# Patient Record
Sex: FEMALE | Race: WHITE | NOT HISPANIC OR LATINO
[De-identification: names, ages, dates, MRNs, and addresses within clinical notes are randomized per-mention and may not be internally consistent; named-entity substitution may affect disease eponyms.]

---

## 2017-01-03 ENCOUNTER — ASOB RESULT (OUTPATIENT)
Age: 37
End: 2017-01-03

## 2017-01-03 ENCOUNTER — APPOINTMENT (OUTPATIENT)
Dept: ANTEPARTUM | Facility: CLINIC | Age: 37
End: 2017-01-03

## 2017-02-28 ENCOUNTER — ASOB RESULT (OUTPATIENT)
Age: 37
End: 2017-02-28

## 2017-02-28 ENCOUNTER — APPOINTMENT (OUTPATIENT)
Dept: ANTEPARTUM | Facility: CLINIC | Age: 37
End: 2017-02-28

## 2017-03-10 ENCOUNTER — TRANSCRIPTION ENCOUNTER (OUTPATIENT)
Age: 37
End: 2017-03-10

## 2017-05-01 ENCOUNTER — APPOINTMENT (OUTPATIENT)
Dept: ANTEPARTUM | Facility: CLINIC | Age: 37
End: 2017-05-01

## 2017-05-01 ENCOUNTER — ASOB RESULT (OUTPATIENT)
Age: 37
End: 2017-05-01

## 2017-07-25 ENCOUNTER — INPATIENT (INPATIENT)
Facility: HOSPITAL | Age: 37
LOS: 2 days | Discharge: HOME | End: 2017-07-28
Payer: COMMERCIAL

## 2017-07-25 ENCOUNTER — TRANSCRIPTION ENCOUNTER (OUTPATIENT)
Age: 37
End: 2017-07-25

## 2017-07-25 VITALS — WEIGHT: 167.55 LBS | HEIGHT: 63 IN

## 2017-07-25 DIAGNOSIS — Z3A.00 WEEKS OF GESTATION OF PREGNANCY NOT SPECIFIED: ICD-10-CM

## 2017-07-25 DIAGNOSIS — O26.899 OTHER SPECIFIED PREGNANCY RELATED CONDITIONS, UNSPECIFIED TRIMESTER: ICD-10-CM

## 2017-07-25 LAB
ANTIBODY ID 1_1: SIGNIFICANT CHANGE UP
BASOPHILS # BLD AUTO: 0.04 K/UL — SIGNIFICANT CHANGE UP (ref 0–0.2)
BASOPHILS NFR BLD AUTO: 0.3 % — SIGNIFICANT CHANGE UP (ref 0–2)
BLD GP AB SCN SERPL QL: POSITIVE — SIGNIFICANT CHANGE UP
DAT POLY-SP REAG RBC QL: NEGATIVE — SIGNIFICANT CHANGE UP
EOSINOPHIL # BLD AUTO: 0.03 K/UL — SIGNIFICANT CHANGE UP (ref 0–0.5)
EOSINOPHIL NFR BLD AUTO: 0.2 % — SIGNIFICANT CHANGE UP (ref 0–6)
HCT VFR BLD CALC: 38.1 % — SIGNIFICANT CHANGE UP (ref 34.5–45)
HGB BLD-MCNC: 13.9 G/DL — SIGNIFICANT CHANGE UP (ref 11.5–15.5)
IMM GRANULOCYTES # BLD AUTO: 0.08 # — SIGNIFICANT CHANGE UP
IMM GRANULOCYTES NFR BLD AUTO: 0.6 % — SIGNIFICANT CHANGE UP (ref 0–1.5)
LYMPHOCYTES # BLD AUTO: 16.1 % — SIGNIFICANT CHANGE UP (ref 13–44)
LYMPHOCYTES # BLD AUTO: 2.32 K/UL — SIGNIFICANT CHANGE UP (ref 1–3.3)
MCHC RBC-ENTMCNC: 33.3 PG — SIGNIFICANT CHANGE UP (ref 27–34)
MCHC RBC-ENTMCNC: 36.5 % — HIGH (ref 32–36)
MCV RBC AUTO: 91.4 FL — SIGNIFICANT CHANGE UP (ref 80–100)
MONOCYTES # BLD AUTO: 0.59 K/UL — SIGNIFICANT CHANGE UP (ref 0–0.9)
MONOCYTES NFR BLD AUTO: 4.1 % — SIGNIFICANT CHANGE UP (ref 2–14)
NEUTROPHILS # BLD AUTO: 11.35 K/UL — HIGH (ref 1.8–7.4)
NEUTROPHILS NFR BLD AUTO: 78.7 % — HIGH (ref 43–77)
NRBC # FLD: 0 — SIGNIFICANT CHANGE UP
PLATELET # BLD AUTO: 295 K/UL — SIGNIFICANT CHANGE UP (ref 150–400)
PMV BLD: 9.6 FL — SIGNIFICANT CHANGE UP (ref 7–13)
RBC # BLD: 4.17 M/UL — SIGNIFICANT CHANGE UP (ref 3.8–5.2)
RBC # FLD: 12.7 % — SIGNIFICANT CHANGE UP (ref 10.3–14.5)
RH IG SCN BLD-IMP: NEGATIVE — SIGNIFICANT CHANGE UP
RH IG SCN BLD-IMP: NEGATIVE — SIGNIFICANT CHANGE UP
WBC # BLD: 14.41 K/UL — HIGH (ref 3.8–10.5)
WBC # FLD AUTO: 14.41 K/UL — HIGH (ref 3.8–10.5)

## 2017-07-25 PROCEDURE — 86077 PHYS BLOOD BANK SERV XMATCH: CPT

## 2017-07-25 RX ORDER — SODIUM CHLORIDE 9 MG/ML
1000 INJECTION, SOLUTION INTRAVENOUS ONCE
Qty: 0 | Refills: 0 | Status: DISCONTINUED | OUTPATIENT
Start: 2017-07-25 | End: 2017-07-26

## 2017-07-25 RX ORDER — PENICILLIN G POTASSIUM 5000000 [IU]/1
5 POWDER, FOR SOLUTION INTRAMUSCULAR; INTRAPLEURAL; INTRATHECAL; INTRAVENOUS ONCE
Qty: 0 | Refills: 0 | Status: COMPLETED | OUTPATIENT
Start: 2017-07-25 | End: 2017-07-25

## 2017-07-25 RX ORDER — SODIUM CHLORIDE 9 MG/ML
1000 INJECTION, SOLUTION INTRAVENOUS
Qty: 0 | Refills: 0 | Status: DISCONTINUED | OUTPATIENT
Start: 2017-07-25 | End: 2017-07-27

## 2017-07-25 RX ORDER — CITRIC ACID/SODIUM CITRATE 300-500 MG
15 SOLUTION, ORAL ORAL EVERY 4 HOURS
Qty: 0 | Refills: 0 | Status: DISCONTINUED | OUTPATIENT
Start: 2017-07-25 | End: 2017-07-26

## 2017-07-25 RX ORDER — SODIUM CHLORIDE 9 MG/ML
1000 INJECTION, SOLUTION INTRAVENOUS
Qty: 0 | Refills: 0 | Status: DISCONTINUED | OUTPATIENT
Start: 2017-07-25 | End: 2017-07-26

## 2017-07-25 RX ORDER — OXYTOCIN 10 UNIT/ML
333.33 VIAL (ML) INJECTION
Qty: 20 | Refills: 0 | Status: COMPLETED | OUTPATIENT
Start: 2017-07-25

## 2017-07-25 RX ORDER — PENICILLIN G POTASSIUM 5000000 [IU]/1
POWDER, FOR SOLUTION INTRAMUSCULAR; INTRAPLEURAL; INTRATHECAL; INTRAVENOUS
Qty: 0 | Refills: 0 | Status: DISCONTINUED | OUTPATIENT
Start: 2017-07-25 | End: 2017-07-26

## 2017-07-25 RX ORDER — PENICILLIN G POTASSIUM 5000000 [IU]/1
2.5 POWDER, FOR SOLUTION INTRAMUSCULAR; INTRAPLEURAL; INTRATHECAL; INTRAVENOUS EVERY 4 HOURS
Qty: 0 | Refills: 0 | Status: DISCONTINUED | OUTPATIENT
Start: 2017-07-25 | End: 2017-07-26

## 2017-07-25 RX ADMIN — SODIUM CHLORIDE 125 MILLILITER(S): 9 INJECTION, SOLUTION INTRAVENOUS at 17:07

## 2017-07-25 RX ADMIN — PENICILLIN G POTASSIUM 200 MILLION UNIT(S): 5000000 POWDER, FOR SOLUTION INTRAMUSCULAR; INTRAPLEURAL; INTRATHECAL; INTRAVENOUS at 19:42

## 2017-07-25 RX ADMIN — PENICILLIN G POTASSIUM 200 MILLION UNIT(S): 5000000 POWDER, FOR SOLUTION INTRAMUSCULAR; INTRAPLEURAL; INTRATHECAL; INTRAVENOUS at 15:12

## 2017-07-25 RX ADMIN — SODIUM CHLORIDE 125 MILLILITER(S): 9 INJECTION, SOLUTION INTRAVENOUS at 10:57

## 2017-07-25 RX ADMIN — PENICILLIN G POTASSIUM 200 MILLION UNIT(S): 5000000 POWDER, FOR SOLUTION INTRAMUSCULAR; INTRAPLEURAL; INTRATHECAL; INTRAVENOUS at 23:45

## 2017-07-25 RX ADMIN — PENICILLIN G POTASSIUM 200 MILLION UNIT(S): 5000000 POWDER, FOR SOLUTION INTRAMUSCULAR; INTRAPLEURAL; INTRATHECAL; INTRAVENOUS at 11:05

## 2017-07-25 NOTE — DISCHARGE NOTE OB - MEDICATION SUMMARY - MEDICATIONS TO TAKE
I will START or STAY ON the medications listed below when I get home from the hospital:    acetaminophen 325 mg oral tablet  -- 3 tab(s) by mouth every 6 hours  -- Indication: For Pain    ibuprofen 600 mg oral tablet  -- 1 tab(s) by mouth every 6 hours  -- Indication: For Pain    magnesium hydroxide 8% oral suspension  -- 30 milliliter(s) by mouth 2 times a day, As needed, Constipation  -- Indication: For constipation    lanolin topical ointment  -- 1 application on skin every 6 hours, As needed, Sore Nipples  -- Indication: For sore nipples    Prenatal Multivitamins with Folic Acid 1 mg oral tablet  -- 1 tab(s) by mouth once a day  -- Indication: For vitamin    docusate sodium 100 mg oral capsule  -- 1 cap(s) by mouth 2 times a day, As needed, Stool Softening  -- Indication: For stool softner

## 2017-07-25 NOTE — DISCHARGE NOTE OB - CARE PLAN
Principal Discharge DX:	40 weeks gestation of pregnancy  Goal:	Safe delivery of patient  Instructions for follow-up, activity and diet:	Nothing in the vagina, no tampons, douches, baths, swimming or sex for 6-8 weeks.  Regular diet, follow up visit in 3 weeks  Secondary Diagnosis:	Amniotic fluid leaking

## 2017-07-25 NOTE — DISCHARGE NOTE OB - OTHER SIGNIFICANT FINDINGS
37 y/o W/F G1 at 40.5 weeks who presented with SROM at 7:40 am on 7/25- clear fluid. Pt has had a benign prenatal course. Pelvic exam on admission revealed cervix to be 1cm, 50%, -3 station. She was admitted for IOL with po Cytotec 37 y/o W/F G1 at 40.5 weeks who presented with SROM at 7:40 am on - clear fluid. Pt has had a benign prenatal course. Pelvic exam on admission revealed cervix to be 1cm, 50%, -3 station. She was admitted for IOL with po Cytotec   a viable baby girl with second degree laceration.

## 2017-07-25 NOTE — DISCHARGE NOTE OB - MATERIALS PROVIDED
Breastfeeding Log/Tdap Vaccination (VIS Pub Date: 2012)/[facility] Hearing Screen Program/Guide to Postpartum Care/Shaken Baby Prevention Handout/Breastfeeding Guide and Packet/Birth Certificate Instructions/  Immunization Record/[facility] [place] Screening Program

## 2017-07-25 NOTE — DISCHARGE NOTE OB - PATIENT PORTAL LINK FT
“You can access the FollowHealth Patient Portal, offered by [facility], by registering with the following website: [email or web address]”

## 2017-07-25 NOTE — DISCHARGE NOTE OB - CARE PROVIDER_API CALL
Shannan Youssef), Obstetrics and Gynecology  [address] [place] [address]  Phone: [phone]  Fax: [phone]

## 2017-07-26 LAB — T PALLIDUM AB TITR SER: NEGATIVE — SIGNIFICANT CHANGE UP

## 2017-07-26 RX ORDER — ACETAMINOPHEN 500 MG
650 TABLET ORAL EVERY 6 HOURS
Qty: 0 | Refills: 0 | Status: DISCONTINUED | OUTPATIENT
Start: 2017-07-26 | End: 2017-07-27

## 2017-07-26 RX ORDER — SODIUM CHLORIDE 9 MG/ML
3 INJECTION INTRAMUSCULAR; INTRAVENOUS; SUBCUTANEOUS EVERY 8 HOURS
Qty: 0 | Refills: 0 | Status: DISCONTINUED | OUTPATIENT
Start: 2017-07-26 | End: 2017-07-27

## 2017-07-26 RX ORDER — DIBUCAINE 1 %
1 OINTMENT (GRAM) RECTAL EVERY 4 HOURS
Qty: 0 | Refills: 0 | Status: DISCONTINUED | OUTPATIENT
Start: 2017-07-26 | End: 2017-07-28

## 2017-07-26 RX ORDER — DOCUSATE SODIUM 100 MG
100 CAPSULE ORAL
Qty: 0 | Refills: 0 | Status: DISCONTINUED | OUTPATIENT
Start: 2017-07-26 | End: 2017-07-28

## 2017-07-26 RX ORDER — SIMETHICONE 80 MG/1
80 TABLET, CHEWABLE ORAL EVERY 6 HOURS
Qty: 0 | Refills: 0 | Status: DISCONTINUED | OUTPATIENT
Start: 2017-07-26 | End: 2017-07-28

## 2017-07-26 RX ORDER — DIPHENHYDRAMINE HCL 50 MG
25 CAPSULE ORAL EVERY 6 HOURS
Qty: 0 | Refills: 0 | Status: DISCONTINUED | OUTPATIENT
Start: 2017-07-26 | End: 2017-07-28

## 2017-07-26 RX ORDER — MAGNESIUM HYDROXIDE 400 MG/1
30 TABLET, CHEWABLE ORAL
Qty: 0 | Refills: 0 | Status: DISCONTINUED | OUTPATIENT
Start: 2017-07-26 | End: 2017-07-28

## 2017-07-26 RX ORDER — SODIUM CHLORIDE 9 MG/ML
1000 INJECTION, SOLUTION INTRAVENOUS
Qty: 0 | Refills: 0 | Status: DISCONTINUED | OUTPATIENT
Start: 2017-07-26 | End: 2017-07-27

## 2017-07-26 RX ORDER — PRAMOXINE HYDROCHLORIDE 150 MG/15G
1 AEROSOL, FOAM RECTAL EVERY 4 HOURS
Qty: 0 | Refills: 0 | Status: DISCONTINUED | OUTPATIENT
Start: 2017-07-26 | End: 2017-07-27

## 2017-07-26 RX ORDER — IBUPROFEN 200 MG
600 TABLET ORAL EVERY 6 HOURS
Qty: 0 | Refills: 0 | Status: DISCONTINUED | OUTPATIENT
Start: 2017-07-26 | End: 2017-07-27

## 2017-07-26 RX ORDER — HYDROCORTISONE 1 %
1 OINTMENT (GRAM) TOPICAL EVERY 4 HOURS
Qty: 0 | Refills: 0 | Status: DISCONTINUED | OUTPATIENT
Start: 2017-07-26 | End: 2017-07-27

## 2017-07-26 RX ORDER — LANOLIN
1 OINTMENT (GRAM) TOPICAL EVERY 6 HOURS
Qty: 0 | Refills: 0 | Status: DISCONTINUED | OUTPATIENT
Start: 2017-07-26 | End: 2017-07-28

## 2017-07-26 RX ORDER — TETANUS TOXOID, REDUCED DIPHTHERIA TOXOID AND ACELLULAR PERTUSSIS VACCINE, ADSORBED 5; 2.5; 8; 8; 2.5 [IU]/.5ML; [IU]/.5ML; UG/.5ML; UG/.5ML; UG/.5ML
0.5 SUSPENSION INTRAMUSCULAR ONCE
Qty: 0 | Refills: 0 | Status: DISCONTINUED | OUTPATIENT
Start: 2017-07-26 | End: 2017-07-28

## 2017-07-26 RX ORDER — OXYTOCIN 10 UNIT/ML
333.33 VIAL (ML) INJECTION
Qty: 20 | Refills: 0 | Status: COMPLETED | OUTPATIENT
Start: 2017-07-26 | End: 2017-07-26

## 2017-07-26 RX ORDER — OXYCODONE AND ACETAMINOPHEN 5; 325 MG/1; MG/1
2 TABLET ORAL EVERY 6 HOURS
Qty: 0 | Refills: 0 | Status: DISCONTINUED | OUTPATIENT
Start: 2017-07-26 | End: 2017-07-27

## 2017-07-26 RX ORDER — GLYCERIN ADULT
1 SUPPOSITORY, RECTAL RECTAL AT BEDTIME
Qty: 0 | Refills: 0 | Status: DISCONTINUED | OUTPATIENT
Start: 2017-07-26 | End: 2017-07-28

## 2017-07-26 RX ORDER — OXYTOCIN 10 UNIT/ML
41.67 VIAL (ML) INJECTION
Qty: 20 | Refills: 0 | Status: DISCONTINUED | OUTPATIENT
Start: 2017-07-26 | End: 2017-07-27

## 2017-07-26 RX ORDER — OXYTOCIN 10 UNIT/ML
2 VIAL (ML) INJECTION
Qty: 30 | Refills: 0 | Status: DISCONTINUED | OUTPATIENT
Start: 2017-07-26 | End: 2017-07-27

## 2017-07-26 RX ORDER — AER TRAVELER 0.5 G/1
1 SOLUTION RECTAL; TOPICAL EVERY 4 HOURS
Qty: 0 | Refills: 0 | Status: DISCONTINUED | OUTPATIENT
Start: 2017-07-26 | End: 2017-07-28

## 2017-07-26 RX ADMIN — SODIUM CHLORIDE 150 MILLILITER(S): 9 INJECTION, SOLUTION INTRAVENOUS at 03:50

## 2017-07-26 RX ADMIN — Medication 2 MILLIUNIT(S)/MIN: at 05:06

## 2017-07-26 RX ADMIN — Medication 999.99 MILLIUNIT(S)/MIN: at 13:15

## 2017-07-26 RX ADMIN — PENICILLIN G POTASSIUM 200 MILLION UNIT(S): 5000000 POWDER, FOR SOLUTION INTRAMUSCULAR; INTRAPLEURAL; INTRATHECAL; INTRAVENOUS at 08:48

## 2017-07-26 RX ADMIN — Medication 650 MILLIGRAM(S): at 17:44

## 2017-07-26 RX ADMIN — Medication 1 TABLET(S): at 18:13

## 2017-07-26 RX ADMIN — Medication 650 MILLIGRAM(S): at 18:24

## 2017-07-26 RX ADMIN — PENICILLIN G POTASSIUM 200 MILLION UNIT(S): 5000000 POWDER, FOR SOLUTION INTRAMUSCULAR; INTRAPLEURAL; INTRATHECAL; INTRAVENOUS at 04:21

## 2017-07-26 RX ADMIN — SODIUM CHLORIDE 125 MILLILITER(S): 9 INJECTION, SOLUTION INTRAVENOUS at 01:43

## 2017-07-26 RX ADMIN — Medication 125 MILLIUNIT(S)/MIN: at 15:13

## 2017-07-26 RX ADMIN — SODIUM CHLORIDE 3 MILLILITER(S): 9 INJECTION INTRAMUSCULAR; INTRAVENOUS; SUBCUTANEOUS at 22:30

## 2017-07-27 LAB — RBC # BLD FETUS AUTO: 0 — SIGNIFICANT CHANGE UP

## 2017-07-27 RX ORDER — MAGNESIUM HYDROXIDE 400 MG/1
30 TABLET, CHEWABLE ORAL
Qty: 0 | Refills: 0 | DISCHARGE
Start: 2017-07-27

## 2017-07-27 RX ORDER — PRAMOXINE HYDROCHLORIDE 150 MG/15G
1 AEROSOL, FOAM RECTAL EVERY 4 HOURS
Qty: 0 | Refills: 0 | Status: DISCONTINUED | OUTPATIENT
Start: 2017-07-27 | End: 2017-07-28

## 2017-07-27 RX ORDER — ACETAMINOPHEN 500 MG
975 TABLET ORAL EVERY 6 HOURS
Qty: 0 | Refills: 0 | Status: DISCONTINUED | OUTPATIENT
Start: 2017-07-27 | End: 2017-07-28

## 2017-07-27 RX ORDER — LANOLIN
1 OINTMENT (GRAM) TOPICAL
Qty: 0 | Refills: 0 | DISCHARGE
Start: 2017-07-27

## 2017-07-27 RX ORDER — IBUPROFEN 200 MG
1 TABLET ORAL
Qty: 40 | Refills: 0
Start: 2017-07-27 | End: 2017-08-06

## 2017-07-27 RX ORDER — DOCUSATE SODIUM 100 MG
1 CAPSULE ORAL
Qty: 0 | Refills: 0 | DISCHARGE
Start: 2017-07-27

## 2017-07-27 RX ORDER — OXYCODONE HYDROCHLORIDE 5 MG/1
5 TABLET ORAL
Qty: 0 | Refills: 0 | Status: DISCONTINUED | OUTPATIENT
Start: 2017-07-27 | End: 2017-07-28

## 2017-07-27 RX ORDER — ACETAMINOPHEN 500 MG
3 TABLET ORAL
Qty: 0 | Refills: 0 | DISCHARGE
Start: 2017-07-27

## 2017-07-27 RX ORDER — HYDROCORTISONE 1 %
1 OINTMENT (GRAM) TOPICAL EVERY 4 HOURS
Qty: 0 | Refills: 0 | Status: DISCONTINUED | OUTPATIENT
Start: 2017-07-27 | End: 2017-07-28

## 2017-07-27 RX ORDER — IBUPROFEN 200 MG
600 TABLET ORAL EVERY 6 HOURS
Qty: 0 | Refills: 0 | Status: DISCONTINUED | OUTPATIENT
Start: 2017-07-27 | End: 2017-07-28

## 2017-07-27 RX ORDER — OXYCODONE HYDROCHLORIDE 5 MG/1
5 TABLET ORAL EVERY 4 HOURS
Qty: 0 | Refills: 0 | Status: DISCONTINUED | OUTPATIENT
Start: 2017-07-27 | End: 2017-07-28

## 2017-07-27 RX ADMIN — Medication 600 MILLIGRAM(S): at 17:44

## 2017-07-27 RX ADMIN — Medication 975 MILLIGRAM(S): at 17:18

## 2017-07-27 RX ADMIN — Medication 600 MILLIGRAM(S): at 23:08

## 2017-07-27 RX ADMIN — Medication 600 MILLIGRAM(S): at 17:20

## 2017-07-27 RX ADMIN — Medication 975 MILLIGRAM(S): at 23:08

## 2017-07-27 RX ADMIN — Medication 975 MILLIGRAM(S): at 06:20

## 2017-07-27 RX ADMIN — Medication 600 MILLIGRAM(S): at 01:00

## 2017-07-27 RX ADMIN — Medication 600 MILLIGRAM(S): at 12:46

## 2017-07-27 RX ADMIN — Medication 975 MILLIGRAM(S): at 06:47

## 2017-07-27 RX ADMIN — SODIUM CHLORIDE 3 MILLILITER(S): 9 INJECTION INTRAMUSCULAR; INTRAVENOUS; SUBCUTANEOUS at 06:30

## 2017-07-27 RX ADMIN — Medication 650 MILLIGRAM(S): at 01:00

## 2017-07-27 RX ADMIN — Medication 600 MILLIGRAM(S): at 06:20

## 2017-07-27 RX ADMIN — Medication 600 MILLIGRAM(S): at 14:46

## 2017-07-27 RX ADMIN — Medication 100 MILLIGRAM(S): at 17:18

## 2017-07-27 RX ADMIN — Medication 650 MILLIGRAM(S): at 00:03

## 2017-07-27 RX ADMIN — Medication 600 MILLIGRAM(S): at 00:04

## 2017-07-27 RX ADMIN — Medication 1 TABLET(S): at 14:46

## 2017-07-27 RX ADMIN — Medication 975 MILLIGRAM(S): at 17:44

## 2017-07-27 RX ADMIN — Medication 600 MILLIGRAM(S): at 06:47

## 2017-07-27 NOTE — LACTATION INITIAL EVALUATION - INTERVENTION OUTCOME
verbalizes understanding/nbn demonstrated  deep latch and  performed  with sucking and swallowing  noted   pt instructed to notify pediatrician if nbn not feeding 8-12 times/24 hours and not voiding and stooling according to breastfeeding log.   ,  outside  support   given .   rn  made aware of plan and to document latch and  positioning of  further  feeding and  assist  as  necessary.

## 2017-07-27 NOTE — PROGRESS NOTE ADULT - SUBJECTIVE AND OBJECTIVE BOX
S:MAGDI MAGANA is a 36y Female status post vaginal delivery.   No h/o HF  Other pregnancy-related conditions, antepartum  Weeks of gestation of pregnancy not specified  MEWS Score  40 weeks gestation of pregnancy  Amniotic fluid leaking        O:T(C): 36.7 (07-27-17 @ 05:27), Max: 36.7 (07-27-17 @ 05:27)  HR: 73 (07-27-17 @ 05:27) (73 - 86)  BP: 103/57 (07-27-17 @ 05:27) (90/64 - 103/57)  RR: 18 (07-27-17 @ 05:27) (18 - 18)  SpO2: 99% (07-27-17 @ 05:27) (99% - 99%)  Wt(kg): --                                   Blood type:  ABO Interpretation: A (07-25 @ 22:49)        Rh Interpretation: Negative (07-25 @ 22:49)                         Rubella:  IMMUNE          General: alert and oriented        Breast:  soft, nontender,         Abdomin:  soft nontender, BS+        Fundus:  firm, nontender, below the umbilicus        Extremities:  no edema, no cyanosis, normal reflexes        Lochia:  mild                  A:  Stable postpartum on Day 1.      P:  Routine postpartum care. I reviewed pain medications with her-ibuprofen 600 mg every 6 hours/tylenol 650mg four hours after         ibuprofen dose to cover breakthrough pain.

## 2017-07-27 NOTE — LACTATION INITIAL EVALUATION - LACTATION INTERVENTIONS
initiate hand expression routine/initiate skin to skin/assisted with deep latch and positioning  discussed  signs  of  effective  feeding and  swallowing.  discussed  compression at  breast when  nbn  stops  drinking  and  is  still sucking.  ,  discussed   ways  to  keep  nbn  awake

## 2017-07-28 VITALS
OXYGEN SATURATION: 98 % | TEMPERATURE: 98 F | SYSTOLIC BLOOD PRESSURE: 119 MMHG | RESPIRATION RATE: 18 BRPM | HEART RATE: 89 BPM | DIASTOLIC BLOOD PRESSURE: 69 MMHG

## 2017-07-28 RX ADMIN — Medication 600 MILLIGRAM(S): at 07:02

## 2017-07-28 RX ADMIN — Medication 600 MILLIGRAM(S): at 08:00

## 2017-07-28 RX ADMIN — Medication 975 MILLIGRAM(S): at 00:00

## 2017-07-28 RX ADMIN — Medication 975 MILLIGRAM(S): at 08:00

## 2017-07-28 RX ADMIN — Medication 975 MILLIGRAM(S): at 07:02

## 2017-07-28 RX ADMIN — Medication 600 MILLIGRAM(S): at 00:00

## 2017-07-28 NOTE — PROGRESS NOTE ADULT - SUBJECTIVE AND OBJECTIVE BOX
S:  MAGDI MAGANA is a 36y Female [place] [facility] vaginal delivery Day 2.        She is comfortable and offers no compliants.    O:  T(C): 36.7 (07-28-17 @ 06:02), Max: 36.8 (07-27-17 @ 22:00)  HR: 89 (07-28-17 @ 06:02) (74 - 89)  BP: 119/69 (07-28-17 @ 06:02) (111/61 - 119/69)  RR: 18 (07-28-17 @ 06:02) (17 - 18)  SpO2: 98% (07-28-17 @ 06:02) (98% - 99%)  Wt(kg): --                       Blood type:Type + Screen (07.25.17 @ 10:25)    ABO Interpretation: A    Rh Interpretation: Negative    Antibody Screen: Positive                                 Rubella:  IMMUNE               General: alert and oriented           Breast:  soft, nontender,            Abdomin:  soft nontender, BS+, Flatus(+), BM(+)           Fundus:  firm, nontender, below the umbilicus           Extremities:  no edema, no cyanosis, normal reflexes           Lochia:  mild    A:  Stable, Postpartum    P:   MAGDI MAGANA will be discharged home today. She is given instructions:                            1. nothing per vagina-no tampons, douches, baths, swiming or sex for 6-8 weeks             2.  to take ibuprofen 600 mg every 6 hours for pain or cramps. (Can take two Tylenol 325mg tablets every 6 hours as an                    alternative pain medication-NO MORE THAN 4000mg of Tylenol over 24hours)                       3.  Call the office and make postpartum visit for 3 weeks; sooner if bleeding becomes heavier, or she has feelings of                  depression or anxiety or develops a fever greater than 100.4 F.                 To use abdominal binder while awake as needed                 Verbal discharge instructions d/w patient  - discharge summary to be  given to her on discharge for the hospital             4.  No driving x 2 weeks.  MAGDI MAGANA is not to drive if taking narcotics             5.  Continue taking prenatal vitamins

## 2019-03-13 NOTE — PROGRESS NOTE ADULT - ATTENDING COMMENTS
Patient is doing well  Routine postpartum care
Strong peripheral pulses
Postpartum instructions given

## 2019-04-16 ENCOUNTER — APPOINTMENT (OUTPATIENT)
Dept: ANTEPARTUM | Facility: CLINIC | Age: 39
End: 2019-04-16
Payer: COMMERCIAL

## 2019-04-16 ENCOUNTER — ASOB RESULT (OUTPATIENT)
Age: 39
End: 2019-04-16

## 2019-04-16 PROCEDURE — 76801 OB US < 14 WKS SINGLE FETUS: CPT

## 2019-04-16 PROCEDURE — 76813 OB US NUCHAL MEAS 1 GEST: CPT

## 2019-04-16 PROCEDURE — 36416 COLLJ CAPILLARY BLOOD SPEC: CPT

## 2019-06-14 ENCOUNTER — APPOINTMENT (OUTPATIENT)
Dept: ANTEPARTUM | Facility: CLINIC | Age: 39
End: 2019-06-14
Payer: COMMERCIAL

## 2019-06-14 ENCOUNTER — ASOB RESULT (OUTPATIENT)
Age: 39
End: 2019-06-14

## 2019-06-14 PROCEDURE — 76811 OB US DETAILED SNGL FETUS: CPT

## 2019-06-14 PROCEDURE — 76817 TRANSVAGINAL US OBSTETRIC: CPT

## 2019-10-31 ENCOUNTER — INPATIENT (INPATIENT)
Facility: HOSPITAL | Age: 39
LOS: 1 days | Discharge: HOME | End: 2019-11-02

## 2019-10-31 ENCOUNTER — TRANSCRIPTION ENCOUNTER (OUTPATIENT)
Age: 39
End: 2019-10-31

## 2019-10-31 VITALS
TEMPERATURE: 98 F | DIASTOLIC BLOOD PRESSURE: 84 MMHG | HEART RATE: 102 BPM | SYSTOLIC BLOOD PRESSURE: 136 MMHG | RESPIRATION RATE: 18 BRPM

## 2019-10-31 DIAGNOSIS — O26.899 OTHER SPECIFIED PREGNANCY RELATED CONDITIONS, UNSPECIFIED TRIMESTER: ICD-10-CM

## 2019-10-31 DIAGNOSIS — Z3A.00 WEEKS OF GESTATION OF PREGNANCY NOT SPECIFIED: ICD-10-CM

## 2019-10-31 LAB
BASOPHILS # BLD AUTO: 0.05 K/UL — SIGNIFICANT CHANGE UP (ref 0–0.2)
BASOPHILS NFR BLD AUTO: 0.3 % — SIGNIFICANT CHANGE UP (ref 0–2)
BLD GP AB SCN SERPL QL: NEGATIVE — SIGNIFICANT CHANGE UP
EOSINOPHIL # BLD AUTO: 0.01 K/UL — SIGNIFICANT CHANGE UP (ref 0–0.5)
EOSINOPHIL NFR BLD AUTO: 0.1 % — SIGNIFICANT CHANGE UP (ref 0–6)
HCT VFR BLD CALC: 39.5 % — SIGNIFICANT CHANGE UP (ref 34.5–45)
HGB BLD-MCNC: 13.7 G/DL — SIGNIFICANT CHANGE UP (ref 11.5–15.5)
IMM GRANULOCYTES NFR BLD AUTO: 0.8 % — SIGNIFICANT CHANGE UP (ref 0–1.5)
LYMPHOCYTES # BLD AUTO: 1.16 K/UL — SIGNIFICANT CHANGE UP (ref 1–3.3)
LYMPHOCYTES # BLD AUTO: 6.9 % — LOW (ref 13–44)
MCHC RBC-ENTMCNC: 32.5 PG — SIGNIFICANT CHANGE UP (ref 27–34)
MCHC RBC-ENTMCNC: 34.7 % — SIGNIFICANT CHANGE UP (ref 32–36)
MCV RBC AUTO: 93.8 FL — SIGNIFICANT CHANGE UP (ref 80–100)
MONOCYTES # BLD AUTO: 0.56 K/UL — SIGNIFICANT CHANGE UP (ref 0–0.9)
MONOCYTES NFR BLD AUTO: 3.3 % — SIGNIFICANT CHANGE UP (ref 2–14)
NEUTROPHILS # BLD AUTO: 14.88 K/UL — HIGH (ref 1.8–7.4)
NEUTROPHILS NFR BLD AUTO: 88.6 % — HIGH (ref 43–77)
NRBC # FLD: 0 K/UL — SIGNIFICANT CHANGE UP (ref 0–0)
PLATELET # BLD AUTO: 297 K/UL — SIGNIFICANT CHANGE UP (ref 150–400)
PMV BLD: 9.4 FL — SIGNIFICANT CHANGE UP (ref 7–13)
RBC # BLD: 4.21 M/UL — SIGNIFICANT CHANGE UP (ref 3.8–5.2)
RBC # FLD: 12.7 % — SIGNIFICANT CHANGE UP (ref 10.3–14.5)
RH IG SCN BLD-IMP: NEGATIVE — SIGNIFICANT CHANGE UP
T PALLIDUM AB TITR SER: NEGATIVE — SIGNIFICANT CHANGE UP
WBC # BLD: 16.79 K/UL — HIGH (ref 3.8–10.5)
WBC # FLD AUTO: 16.79 K/UL — HIGH (ref 3.8–10.5)

## 2019-10-31 RX ORDER — DIBUCAINE 1 %
1 OINTMENT (GRAM) RECTAL EVERY 6 HOURS
Refills: 0 | Status: DISCONTINUED | OUTPATIENT
Start: 2019-10-31 | End: 2019-11-02

## 2019-10-31 RX ORDER — SODIUM CHLORIDE 9 MG/ML
1000 INJECTION, SOLUTION INTRAVENOUS
Refills: 0 | Status: DISCONTINUED | OUTPATIENT
Start: 2019-10-31 | End: 2019-11-01

## 2019-10-31 RX ORDER — SIMETHICONE 80 MG/1
80 TABLET, CHEWABLE ORAL EVERY 4 HOURS
Refills: 0 | Status: DISCONTINUED | OUTPATIENT
Start: 2019-10-31 | End: 2019-11-02

## 2019-10-31 RX ORDER — PRAMOXINE HYDROCHLORIDE 150 MG/15G
1 AEROSOL, FOAM RECTAL EVERY 4 HOURS
Refills: 0 | Status: DISCONTINUED | OUTPATIENT
Start: 2019-10-31 | End: 2019-11-02

## 2019-10-31 RX ORDER — AER TRAVELER 0.5 G/1
1 SOLUTION RECTAL; TOPICAL EVERY 4 HOURS
Refills: 0 | Status: DISCONTINUED | OUTPATIENT
Start: 2019-10-31 | End: 2019-11-02

## 2019-10-31 RX ORDER — LANOLIN
1 OINTMENT (GRAM) TOPICAL EVERY 6 HOURS
Refills: 0 | Status: DISCONTINUED | OUTPATIENT
Start: 2019-10-31 | End: 2019-11-02

## 2019-10-31 RX ORDER — INFLUENZA VIRUS VACCINE 15; 15; 15; 15 UG/.5ML; UG/.5ML; UG/.5ML; UG/.5ML
0.5 SUSPENSION INTRAMUSCULAR ONCE
Refills: 0 | Status: COMPLETED | OUTPATIENT
Start: 2019-10-31 | End: 2019-11-02

## 2019-10-31 RX ORDER — OXYCODONE HYDROCHLORIDE 5 MG/1
5 TABLET ORAL
Refills: 0 | Status: DISCONTINUED | OUTPATIENT
Start: 2019-10-31 | End: 2019-11-02

## 2019-10-31 RX ORDER — DIPHENHYDRAMINE HCL 50 MG
25 CAPSULE ORAL EVERY 6 HOURS
Refills: 0 | Status: DISCONTINUED | OUTPATIENT
Start: 2019-10-31 | End: 2019-11-02

## 2019-10-31 RX ORDER — ACETAMINOPHEN 500 MG
975 TABLET ORAL
Refills: 0 | Status: DISCONTINUED | OUTPATIENT
Start: 2019-10-31 | End: 2019-11-02

## 2019-10-31 RX ORDER — OXYTOCIN 10 UNIT/ML
2 VIAL (ML) INJECTION
Qty: 30 | Refills: 0 | Status: DISCONTINUED | OUTPATIENT
Start: 2019-10-31 | End: 2019-11-01

## 2019-10-31 RX ORDER — GLYCERIN ADULT
1 SUPPOSITORY, RECTAL RECTAL AT BEDTIME
Refills: 0 | Status: DISCONTINUED | OUTPATIENT
Start: 2019-10-31 | End: 2019-11-02

## 2019-10-31 RX ORDER — MAGNESIUM HYDROXIDE 400 MG/1
30 TABLET, CHEWABLE ORAL
Refills: 0 | Status: DISCONTINUED | OUTPATIENT
Start: 2019-10-31 | End: 2019-11-02

## 2019-10-31 RX ORDER — KETOROLAC TROMETHAMINE 30 MG/ML
30 SYRINGE (ML) INJECTION ONCE
Refills: 0 | Status: DISCONTINUED | OUTPATIENT
Start: 2019-10-31 | End: 2019-10-31

## 2019-10-31 RX ORDER — OXYTOCIN 10 UNIT/ML
333.33 VIAL (ML) INJECTION
Qty: 20 | Refills: 0 | Status: COMPLETED | OUTPATIENT
Start: 2019-10-31 | End: 2019-10-31

## 2019-10-31 RX ORDER — OXYCODONE HYDROCHLORIDE 5 MG/1
5 TABLET ORAL ONCE
Refills: 0 | Status: DISCONTINUED | OUTPATIENT
Start: 2019-10-31 | End: 2019-11-02

## 2019-10-31 RX ORDER — BENZOCAINE 10 %
1 GEL (GRAM) MUCOUS MEMBRANE EVERY 6 HOURS
Refills: 0 | Status: DISCONTINUED | OUTPATIENT
Start: 2019-10-31 | End: 2019-11-02

## 2019-10-31 RX ORDER — HYDROCORTISONE 1 %
1 OINTMENT (GRAM) TOPICAL EVERY 6 HOURS
Refills: 0 | Status: DISCONTINUED | OUTPATIENT
Start: 2019-10-31 | End: 2019-11-02

## 2019-10-31 RX ORDER — OXYTOCIN 10 UNIT/ML
333.33 VIAL (ML) INJECTION
Qty: 20 | Refills: 0 | Status: DISCONTINUED | OUTPATIENT
Start: 2019-10-31 | End: 2019-11-01

## 2019-10-31 RX ORDER — AMPICILLIN TRIHYDRATE 250 MG
2 CAPSULE ORAL ONCE
Refills: 0 | Status: COMPLETED | OUTPATIENT
Start: 2019-10-31 | End: 2019-10-31

## 2019-10-31 RX ORDER — AMPICILLIN TRIHYDRATE 250 MG
1 CAPSULE ORAL EVERY 4 HOURS
Refills: 0 | Status: DISCONTINUED | OUTPATIENT
Start: 2019-10-31 | End: 2019-11-01

## 2019-10-31 RX ORDER — TETANUS TOXOID, REDUCED DIPHTHERIA TOXOID AND ACELLULAR PERTUSSIS VACCINE, ADSORBED 5; 2.5; 8; 8; 2.5 [IU]/.5ML; [IU]/.5ML; UG/.5ML; UG/.5ML; UG/.5ML
0.5 SUSPENSION INTRAMUSCULAR ONCE
Refills: 0 | Status: COMPLETED | OUTPATIENT
Start: 2019-10-31

## 2019-10-31 RX ORDER — IBUPROFEN 200 MG
600 TABLET ORAL EVERY 6 HOURS
Refills: 0 | Status: COMPLETED | OUTPATIENT
Start: 2019-10-31 | End: 2020-09-28

## 2019-10-31 RX ORDER — SODIUM CHLORIDE 9 MG/ML
3 INJECTION INTRAMUSCULAR; INTRAVENOUS; SUBCUTANEOUS EVERY 8 HOURS
Refills: 0 | Status: DISCONTINUED | OUTPATIENT
Start: 2019-10-31 | End: 2019-11-02

## 2019-10-31 RX ADMIN — Medication 108 GRAM(S): at 15:03

## 2019-10-31 RX ADMIN — Medication 108 GRAM(S): at 19:22

## 2019-10-31 RX ADMIN — Medication 2 MILLIUNIT(S)/MIN: at 15:41

## 2019-10-31 RX ADMIN — Medication 30 MILLIGRAM(S): at 22:16

## 2019-10-31 RX ADMIN — Medication 1000 MILLIUNIT(S)/MIN: at 22:16

## 2019-10-31 RX ADMIN — SODIUM CHLORIDE 125 MILLILITER(S): 9 INJECTION, SOLUTION INTRAVENOUS at 12:30

## 2019-10-31 RX ADMIN — Medication 216 GRAM(S): at 11:16

## 2019-10-31 NOTE — OB PROVIDER TRIAGE NOTE - HISTORY OF PRESENT ILLNESS
39 y.o.  SANTIAGO 10/31/19 @ 40 weeks presents with c/o ctx q 2 mins. Pt denies LOF, VB. States +FM and uncomplicated antepartum course.

## 2019-10-31 NOTE — DISCHARGE NOTE OB - MEDICATION SUMMARY - MEDICATIONS TO TAKE
I will START or STAY ON the medications listed below when I get home from the hospital:    acetaminophen 325 mg oral tablet  -- 3 tab(s) by mouth every 6 hours, donot exceed 2000 mg/day  -- Indication: For pain    ibuprofen 200 mg oral tablet  -- 3 tab(s) by mouth every 6 hours  -- Indication: For pain    magnesium hydroxide 8% oral suspension  -- 30 milliliter(s) by mouth 2 times a day, As needed, Constipation  -- Indication: For constipation    lanolin topical ointment  -- 1 application on skin every 6 hours, As needed, nipple soreness  -- Indication: For sore nipples    Prenatal 1  -- Indication: For Vitamin

## 2019-10-31 NOTE — CHART NOTE - NSCHARTNOTEFT_GEN_A_CORE
Attending Note    Vital Signs Last 24 Hrs  T(C): 37.0 (31 Oct 2019 19:27), Max: 37.2 (31 Oct 2019 14:43)  T(F): 98.6 (31 Oct 2019 19:27), Max: 98.96 (31 Oct 2019 14:43)  HR: 123 (31 Oct 2019 19:37) (92 - 157)  BP: 149/89 (31 Oct 2019 19:29) (98/56 - 195/89)  BP(mean): --  RR: 24 (31 Oct 2019 19:27) (16 - 24)  SpO2: 99% (31 Oct 2019 19:37) (91% - 100%)    MAGDI Chaidez is comfortable and FHR is a category 1. I spoke to the couple at 7:30pm about no change in labor progression. They have requested another hour to attempt further descent of the vertex to hopefully allow for a vaginal birth. They are well aware of the fact that she has been fully dilated for over three hours. I will reassess her at 8:30Pm and we will have discussion again about delivery. We discussed restarting pitocin at 2 milliunits/min to optimize the contractions.    FHR is 165 bpm,  moderate variability, with accelerations, occasional early deceleration.  Contractions are every 2 min apart on Pitocin at 2 milliunits/min.                          13.7   16.79 )-----------( 297      ( 31 Oct 2019 11:08 )             39.5                   MEDICATIONS  (STANDING):  ampicillin  IVPB 1 Gram(s) IV Intermittent every 4 hours  influenza   Vaccine 0.5 milliLiter(s) IntraMuscular once  lactated ringers. 1000 milliLiter(s) (125 mL/Hr) IV Continuous <Continuous>  oxytocin Infusion 333.333 milliUNIT(s)/Min (1000 mL/Hr) IV Continuous <Continuous>  oxytocin Infusion 2 milliUNIT(s)/Min (2 mL/Hr) IV Continuous <Continuous>  oxytocin Infusion 2 milliUNIT(s)/Min (2 mL/Hr) IV Continuous <Continuous>    I&O's Summary    31 Oct 2019 07:01  -  31 Oct 2019 19:42  --------------------------------------------------------  IN: 0 mL / OUT: 1700 mL / NET: -1700 mL          YASMANI Youssef MD

## 2019-10-31 NOTE — CHART NOTE - NSCHARTNOTEFT_GEN_A_CORE
Attending Note    Vital Signs Last 24 Hrs  T(C): 36.7 (31 Oct 2019 17:24), Max: 37.2 (31 Oct 2019 14:43)  T(F): 98.06 (31 Oct 2019 17:24), Max: 98.96 (31 Oct 2019 14:43)  HR: 124 (31 Oct 2019 17:27) (92 - 138)  BP: 109/66 (31 Oct 2019 17:20) (109/66 - 195/89)  BP(mean): --  RR: 16 (31 Oct 2019 12:03) (16 - 18)  SpO2: 100% (31 Oct 2019 17:27) (91% - 100%)    MAGDI Chaidez is FD with the vertex at 0 station. There was a fetal heart rate deceleration to 2.5 min with increase FHR to 125bpm  and deep to  bpm for 4.5 min the return to baseline 155 bpm with good variability. Pt had been turned for side to side and pitocin had been turned off during this event. Pt is complains of intense pelvic pain and pressure in the rectum. Anesthesia has been called and they have determined that epidural has been is not in place and it will have to be replaced. Ms Jones is agreeable to all this.  I will reassess once the epidural has been replaced and comfortable.                       13.7   16.79 )-----------( 297      ( 31 Oct 2019 11:08 )             39.5     MEDICATIONS  (STANDING):  ampicillin  IVPB 1 Gram(s) IV Intermittent every 4 hours  influenza   Vaccine 0.5 milliLiter(s) IntraMuscular once  lactated ringers. 1000 milliLiter(s) (125 mL/Hr) IV Continuous <Continuous>  oxytocin Infusion 333.333 milliUNIT(s)/Min (1000 mL/Hr) IV Continuous <Continuous>  oxytocin Infusion 2 milliUNIT(s)/Min (2 mL/Hr) IV Continuous <Continuous>    I&O's Summary    31 Oct 2019 07:01  -  31 Oct 2019 17:30  --------------------------------------------------------  IN: 0 mL / OUT: 1700 mL / NET: -1700 mL        M. Youssef, MD

## 2019-10-31 NOTE — OB PROVIDER DELIVERY SUMMARY - AS DELIV COMPLICATIONS OB
meconium stained fluid/shoulder dystocia/abnormal active phase labor/abnormal fetal heart rate tracing

## 2019-10-31 NOTE — DISCHARGE NOTE OB - HOSPITAL COURSE
40 y/o W/F  EDC 10/31/19 at 40 weeks admitted in active labor on 10/31/19. 38 y/o W/F  EDC 10/31/19 at 40 weeks admitted in active labor on 10/31/19. she was delivere of a viable a baby girl vaginally with mild shoulder dystocia

## 2019-10-31 NOTE — CHART NOTE - NSCHARTNOTEFT_GEN_A_CORE
NP note    Pt checked prior to epidural insertion for increased pressure  /mod ayden/+ accels/no decels  [place] q2-3min  SVE 6-7/90/-2 intact    Dr. Youssef aware and en route (ETA 30min)  Dr. Kiser service attending aware    anu, NP

## 2019-10-31 NOTE — CHART NOTE - NSCHARTNOTEFT_GEN_A_CORE
Attending Note    Vital Signs Last 24 Hrs  T(C): 37.2 (31 Oct 2019 14:43), Max: 37.2 (31 Oct 2019 14:43)  T(F): 98.96 (31 Oct 2019 14:43), Max: 98.96 (31 Oct 2019 14:43)  HR: 99 (31 Oct 2019 14:47) (92 - 136)  BP: 119/62 (31 Oct 2019 14:35) (111/63 - 183/78)  BP(mean): --  RR: 16 (31 Oct 2019 12:03) (16 - 18)  SpO2: 98% (31 Oct 2019 14:47) (91% - 99%)    MAGDI Chaidez is very comfortable. Pelvic exam revealed no change in cervix or station. Contractions are every 2-4 min apart. I discussed the situation and need for augmentation of labor with pitocin. This will determine if she will deliver vaginally or if c/s is indicated. She delivered a 6' 12" with her first pregnancy. A sono done yesterday in my office, gave and EFW of ~ 9 lbs. She understands and agrees with proposed management.    FHR is 155 bpm,  moderate variability, with accelerations, no decelerations  Contractions are every 2-4 min apart.                          13.7   16.79 )-----------( 297      ( 31 Oct 2019 11:08 )             39.5         MEDICATIONS  (STANDING):  ampicillin  IVPB 1 Gram(s) IV Intermittent every 4 hours  influenza   Vaccine 0.5 milliLiter(s) IntraMuscular once  lactated ringers. 1000 milliLiter(s) (125 mL/Hr) IV Continuous <Continuous>  oxytocin Infusion 333.333 milliUNIT(s)/Min (1000 mL/Hr) IV Continuous <Continuous>    I&O's Summary      Pelvic Exam: 8-9 cm, 100 %, -2 station, Vertex.  Membranes are ruptured at 1325.    MDarrin Youssef MD

## 2019-10-31 NOTE — OB PROVIDER DELIVERY SUMMARY - DELIVERY COMPLICATIONS; SHOULDER DYSTOCIA
vertex OA, turned towards the right, right shoulder, [place], suprapubic presssure with Woods manuver with delivery of the left shoulder(Turned

## 2019-10-31 NOTE — OB RN DELIVERY SUMMARY - NS_SEPSISRSKCALC_OBGYN_ALL_OB_FT
EOS calculated successfully. EOS Risk Factor: 0.5/1000 live births ([facility] national incidence); GA=40w;Temp=98.96; ROM=7.483; GBS='Positive'; Antibiotics='Broad spectrum antibiotics > 4 hrs prior to birth'

## 2019-10-31 NOTE — CHART NOTE - NSCHARTNOTEFT_GEN_A_CORE
Attending Note    Vital Signs Last 24 Hrs  T(C): 36.7 (31 Oct 2019 17:24), Max: 37.2 (31 Oct 2019 14:43)  T(F): 98.06 (31 Oct 2019 17:24), Max: 98.96 (31 Oct 2019 14:43)  HR: 132 (31 Oct 2019 18:27) (92 - 155)  BP: 125/58 (31 Oct 2019 18:10) (98/56 - 195/89)  BP(mean): --  RR: 16 (31 Oct 2019 12:03) (16 - 18)  SpO2: 100% (31 Oct 2019 18:32) (91% - 100%)    MAGDI Chaidez is much more comfortable since the epidural has been replaced. Pelvic exam reveals the vertex at 0 station. I had the patient try to push with a contraction. A late deceleration was noted after this followed by fetal tachycardia to 180 bpm. FHR has gradually come down to 165 bpm. I have talked to Ms Reynoso about the need for C/S at this time. She has been FD for ~3 hours with the vertex not descending. There have been two attempts to have her push which have resulted adverse FHR responses. At this time having the vertex labor down has not been succussful. Patient will need to push of a least an hour bring the vertex down. The fetus will not tolerate this given the two other attempts at pushing. If the vertex is delivered there is a risk of shoulder dystocia, given the circumstances, with possible injury ti the infant.  Ms Reynoso and her partner have request time to discuss the situation together.                         13.7   16.79 )-----------( 297      ( 31 Oct 2019 11:08 )             39.5         MEDICATIONS  (STANDING):  ampicillin  IVPB 1 Gram(s) IV Intermittent every 4 hours  influenza   Vaccine 0.5 milliLiter(s) IntraMuscular once  lactated ringers. 1000 milliLiter(s) (125 mL/Hr) IV Continuous <Continuous>  oxytocin Infusion 333.333 milliUNIT(s)/Min (1000 mL/Hr) IV Continuous <Continuous>  oxytocin Infusion 2 milliUNIT(s)/Min (2 mL/Hr) IV Continuous <Continuous>    I&O's Summary    31 Oct 2019 07:01  -  31 Oct 2019 18:34  --------------------------------------------------------  IN: 0 mL / OUT: 1700 mL / NET: -1700 mL

## 2019-10-31 NOTE — OB PROVIDER H&P - ASSESSMENT
39 y.o.  SANTIAGO 10/31/19 @ 40 weeks presents with c/o ctx q 2 mins. Pt denies LOF, VB. States +FM and uncomplicated antepartum course.    allergies:  NKDA  medications:  prenatal vitamins    med/surg hx:  denies  OBGYN hx:  2017  6lbs 12 oz    abdomen soft, nontender  sve /-3/I  nst in progress    a/p: 39 y.o. @ 40 weeks in labor    GBS positive for ampicillin prophylaxis    pt approved for epidural anesthesia    discussed with Dr Youssef and Dr Nathaniel Kay, NP

## 2019-10-31 NOTE — CHART NOTE - NSCHARTNOTEFT_GEN_A_CORE
Attending Note    Vital Signs Last 24 Hrs  T(C): 36.8 (31 Oct 2019 12:03), Max: 36.9 (31 Oct 2019 10:36)  T(F): 98.2 (31 Oct 2019 12:03), Max: 98.4 (31 Oct 2019 10:36)  HR: 101 (31 Oct 2019 13:27) (97 - 136)  BP: 117/72 (31 Oct 2019 13:20) (111/63 - 183/78)  BP(mean): --  RR: 16 (31 Oct 2019 12:03) (16 - 18)  SpO2: 99% (31 Oct 2019 13:27) (91% - 99%)    MAGDI Chaidez is     FHR is 155 bpm,  moderate variability, with accelerations, no decelerations  Contractions are every 2 min apart.                          13.7   16.79 )-----------( 297      ( 31 Oct 2019 11:08 )             39.5       MEDICATIONS  (STANDING):  ampicillin  IVPB 1 Gram(s) IV Intermittent every 4 hours  influenza   Vaccine 0.5 milliLiter(s) IntraMuscular once  lactated ringers. 1000 milliLiter(s) (125 mL/Hr) IV Continuous <Continuous>  oxytocin Infusion 333.333 milliUNIT(s)/Min (1000 mL/Hr) IV Continuous <Continuous>    I&O's Summary        Pelvic Exam:  8.9 cm, 100 %, -2 station, Vertex.  Membranes are ruptured at 13:25. Thin meconium fluid noted.   FHR is a category 1. Will monitor.        MD Henok

## 2019-10-31 NOTE — OB NEONATOLOGY/PEDIATRICIAN DELIVERY SUMMARY - NSPEDSNEONOTESA_OBGYN_ALL_OB_FT
Baby Robert is a 40 wk F born to a 40y/o  mother via . Maternal history of anxiety with therapy. Prenatal history insignificant. Peds called for category 2 tracing and light meconium. Labs neg/NR/imm. A-. GBS + and treated adequately with amp x2. AROM with light mec 8 hours prior to delivery. Baby was born vigorous and crying spontaneously. w/d/s/s. APGARS 9/9. EOS .09.  Mom wants to breastfeed. Consents to Hep B vaccine.

## 2019-10-31 NOTE — DISCHARGE NOTE OB - CARE PROVIDER_API CALL
Shannan Youssef)  Obstetrics and Gynecology  [address] [place] [address]  Phone: [phone]  Fax: [phone]  Follow Up Time:

## 2019-10-31 NOTE — CHART NOTE - NSCHARTNOTEFT_GEN_A_CORE
Attending Note-Intrapartum Note    Vital Signs Last 24 Hrs  T(C): 36.8 (31 Oct 2019 12:03), Max: 36.9 (31 Oct 2019 10:36)  T(F): 98.2 (31 Oct 2019 12:03), Max: 98.4 (31 Oct 2019 10:36)  HR: 100 (31 Oct 2019 12:42) (97 - 136)  BP: 129/65 (31 Oct 2019 12:36) (111/63 - 183/78)  BP(mean): --  RR: 16 (31 Oct 2019 12:03) (16 - 18)  SpO2: 97% (31 Oct 2019 12:42) (91% - 99%)    MAGDI Chaidez is a 38 y/o w/f  at 40 weeks admitted in labor.    FHR is 155bpm,  moderate variability, with accelerations, no decelerations  Contractions are every 2-4 min apart. Ms Jones has gotten epidural and is comfortable. She has received one dose of ampicillin for the treatment of GBS.                          13.7   16.79 )-----------( 297      ( 31 Oct 2019 11:08 )             39.5       MEDICATIONS  (STANDING):  ampicillin  IVPB 1 Gram(s) IV Intermittent every 4 hours  influenza   Vaccine 0.5 milliLiter(s) IntraMuscular once  lactated ringers. 1000 milliLiter(s) (125 mL/Hr) IV Continuous <Continuous>  oxytocin Infusion 333.333 milliUNIT(s)/Min (1000 mL/Hr) IV Continuous <Continuous>    I&O's Summary    Pelvic Exam:  8 cm, 100%, -2 station, Vertex.  Membranes are intact.    MD Henok

## 2019-10-31 NOTE — CHART NOTE - NSCHARTNOTEFT_GEN_A_CORE
Attending Note    Vital Signs Last 24 Hrs  T(C): 37.1 (31 Oct 2019 16:08), Max: 37.2 (31 Oct 2019 14:43)  T(F): 98.78 (31 Oct 2019 16:08), Max: 98.96 (31 Oct 2019 14:43)  HR: 114 (31 Oct 2019 16:37) (92 - 136)  BP: 159/74 (31 Oct 2019 16:36) (111/63 - 183/78)  BP(mean): --  RR: 16 (31 Oct 2019 12:03) (16 - 18)  SpO2: 98% (31 Oct 2019 16:37) (91% - 99%)    MAGDI Chaidez is getting more uncomfortable.     FHR is 155 bpm,  moderate variability, with accelerations,  no decelerations  Contractions are every   2  min apart. She is on pitocin 2 milliunits/min                          13.7   16.79 )-----------( 297      ( 31 Oct 2019 11:08 )             39.5     MEDICATIONS  (STANDING):  ampicillin  IVPB 1 Gram(s) IV Intermittent every 4 hours  influenza   Vaccine 0.5 milliLiter(s) IntraMuscular once  lactated ringers. 1000 milliLiter(s) (125 mL/Hr) IV Continuous <Continuous>  oxytocin Infusion 333.333 milliUNIT(s)/Min (1000 mL/Hr) IV Continuous <Continuous>  oxytocin Infusion 2 milliUNIT(s)/Min (2 mL/Hr) IV Continuous <Continuous>    I&O's Summary    Pelvic Exam: 9.5m, 90%, -1 station, Vertex.  Membranes are ruptured with thin meconium.    Anesthesia has been called for a topoff of the epidural. She has been repositioned to her right lateral side with the peanut.    YASMANI Youssef MD

## 2019-10-31 NOTE — DISCHARGE NOTE OB - CARE PLAN
Principal Discharge DX:	40 weeks gestation of pregnancy  Goal:	Safe delivery of patient  Assessment and plan of treatment:	Nothing in the vagina, no tampons, douches, baths, swimming or sex for 6-8 weeks.  Regular diet, follow up visit in 3 weeks  Secondary Diagnosis:	Labor and delivery indication for care or intervention  Secondary Diagnosis:	Meconium in amniotic fluid

## 2019-10-31 NOTE — OB PROVIDER TRIAGE NOTE - NSOBPROVIDERNOTE_OBGYN_ALL_OB_FT
39 y.o.  SANTIAGO 10/31/19 @ 40 weeks presents with c/o ctx q 2 mins. Pt denies LOF, VB. States +FM and uncomplicated antepartum course.    allergies:  NKDA  medications:  prenatal vitamins    med/surg hx:  denies  OBGYN hx:  2017  6lbs 12 oz    abdomen soft, nontender  sve /-3/I  nst in progress    a/p: 39 y.o. @ 40 weeks in labor    GBS positive for ampicillin prophylaxis    pt approved for epidural anesthesia    discussed with Dr Mikael Kay, NP

## 2019-10-31 NOTE — OB RN DELIVERY SUMMARY - NS_LABORCHARACTER_OBGYN_ALL_OB
Induction of labor-AROM/Augmentation of labor/Antibiotics in labor/Internal electronic FM/External electronic FM/Meconium staining

## 2019-10-31 NOTE — OB PROVIDER DELIVERY SUMMARY - NSPROVIDERDELIVERYNOTE_OBGYN_ALL_OB_FT
pt was delivered vaginally after prolonged active phase. A viable baby girl delivered vaginally over a midline episiotomy. Mild shoulder dystocia. [place], suprapubic pressure and Woods manuvers were utilized.  Thin meconium present and peds was in attendance for the delivery. Apgars9/9, wt 8'9"  Placenta delivered spontaneously and intact with three vessels in the cord.  Vulva was very swollen and carrera was reinserted. To be discharged in the am.

## 2019-10-31 NOTE — DISCHARGE NOTE OB - PATIENT PORTAL LINK FT
You can access the FollowMyHealth Patient Portal offered by [facility] by registering at the following website: [email or web address]. By joining [employer]’s FollowMyHealth portal, you will also be able to view your health information using other applications (apps) compatible with our system.

## 2019-11-01 LAB
HCT VFR BLD CALC: 34.6 % — SIGNIFICANT CHANGE UP (ref 34.5–45)
HGB BLD-MCNC: 11.7 G/DL — SIGNIFICANT CHANGE UP (ref 11.5–15.5)
RBC # BLD FETUS AUTO: 0 — SIGNIFICANT CHANGE UP

## 2019-11-01 RX ORDER — CALCIUM CARBONATE 500(1250)
1 TABLET ORAL EVERY 4 HOURS
Refills: 0 | Status: DISCONTINUED | OUTPATIENT
Start: 2019-11-01 | End: 2019-11-02

## 2019-11-01 RX ORDER — IBUPROFEN 200 MG
3 TABLET ORAL
Qty: 0 | Refills: 0 | DISCHARGE
Start: 2019-11-01

## 2019-11-01 RX ORDER — TETANUS TOXOID, REDUCED DIPHTHERIA TOXOID AND ACELLULAR PERTUSSIS VACCINE, ADSORBED 5; 2.5; 8; 8; 2.5 [IU]/.5ML; [IU]/.5ML; UG/.5ML; UG/.5ML; UG/.5ML
0.5 SUSPENSION INTRAMUSCULAR ONCE
Refills: 0 | Status: COMPLETED | OUTPATIENT
Start: 2019-11-01 | End: 2019-11-02

## 2019-11-01 RX ORDER — IBUPROFEN 200 MG
600 TABLET ORAL EVERY 6 HOURS
Refills: 0 | Status: DISCONTINUED | OUTPATIENT
Start: 2019-11-01 | End: 2019-11-02

## 2019-11-01 RX ORDER — LANOLIN
1 OINTMENT (GRAM) TOPICAL
Qty: 0 | Refills: 0 | DISCHARGE
Start: 2019-11-01

## 2019-11-01 RX ORDER — MAGNESIUM HYDROXIDE 400 MG/1
30 TABLET, CHEWABLE ORAL
Qty: 0 | Refills: 0 | DISCHARGE
Start: 2019-11-01

## 2019-11-01 RX ORDER — ACETAMINOPHEN 500 MG
3 TABLET ORAL
Qty: 0 | Refills: 0 | DISCHARGE
Start: 2019-11-01

## 2019-11-01 RX ADMIN — Medication 600 MILLIGRAM(S): at 06:40

## 2019-11-01 RX ADMIN — SODIUM CHLORIDE 3 MILLILITER(S): 9 INJECTION INTRAMUSCULAR; INTRAVENOUS; SUBCUTANEOUS at 06:30

## 2019-11-01 RX ADMIN — SODIUM CHLORIDE 3 MILLILITER(S): 9 INJECTION INTRAMUSCULAR; INTRAVENOUS; SUBCUTANEOUS at 17:14

## 2019-11-01 RX ADMIN — Medication 975 MILLIGRAM(S): at 14:28

## 2019-11-01 RX ADMIN — SIMETHICONE 80 MILLIGRAM(S): 80 TABLET, CHEWABLE ORAL at 06:16

## 2019-11-01 RX ADMIN — Medication 600 MILLIGRAM(S): at 12:36

## 2019-11-01 RX ADMIN — MAGNESIUM HYDROXIDE 30 MILLILITER(S): 400 TABLET, CHEWABLE ORAL at 11:54

## 2019-11-01 RX ADMIN — Medication 975 MILLIGRAM(S): at 13:45

## 2019-11-01 RX ADMIN — Medication 600 MILLIGRAM(S): at 18:28

## 2019-11-01 RX ADMIN — Medication 1 TABLET(S): at 20:43

## 2019-11-01 RX ADMIN — Medication 975 MILLIGRAM(S): at 20:43

## 2019-11-01 RX ADMIN — Medication 975 MILLIGRAM(S): at 01:50

## 2019-11-01 RX ADMIN — Medication 600 MILLIGRAM(S): at 05:40

## 2019-11-01 RX ADMIN — Medication 1 TABLET(S): at 16:17

## 2019-11-01 RX ADMIN — Medication 975 MILLIGRAM(S): at 02:50

## 2019-11-01 RX ADMIN — Medication 600 MILLIGRAM(S): at 11:53

## 2019-11-01 RX ADMIN — Medication 975 MILLIGRAM(S): at 21:30

## 2019-11-01 NOTE — PROGRESS NOTE ADULT - SUBJECTIVE AND OBJECTIVE BOX
Postpartum Day 1    Ms Jones is resting comfortably and offers no compliants.  VS are stable and she is afebrile.  Lungs are clear  Abdomin is soft N/T  ext: -CCE  lochia is mild    Stable     Routine postpartum care  Rhogam

## 2019-11-02 VITALS
TEMPERATURE: 98 F | SYSTOLIC BLOOD PRESSURE: 115 MMHG | OXYGEN SATURATION: 98 % | HEART RATE: 88 BPM | DIASTOLIC BLOOD PRESSURE: 63 MMHG | RESPIRATION RATE: 18 BRPM

## 2019-11-02 RX ADMIN — Medication 975 MILLIGRAM(S): at 14:31

## 2019-11-02 RX ADMIN — Medication 600 MILLIGRAM(S): at 06:16

## 2019-11-02 RX ADMIN — Medication 1 TABLET(S): at 01:10

## 2019-11-02 RX ADMIN — Medication 975 MILLIGRAM(S): at 14:14

## 2019-11-02 RX ADMIN — Medication 600 MILLIGRAM(S): at 01:04

## 2019-11-02 RX ADMIN — Medication 600 MILLIGRAM(S): at 06:57

## 2019-11-02 RX ADMIN — Medication 600 MILLIGRAM(S): at 01:40

## 2019-11-02 RX ADMIN — TETANUS TOXOID, REDUCED DIPHTHERIA TOXOID AND ACELLULAR PERTUSSIS VACCINE, ADSORBED 0.5 MILLILITER(S): 5; 2.5; 8; 8; 2.5 SUSPENSION INTRAMUSCULAR at 01:03

## 2019-11-02 RX ADMIN — INFLUENZA VIRUS VACCINE 0.5 MILLILITER(S): 15; 15; 15; 15 SUSPENSION INTRAMUSCULAR at 00:59

## 2019-11-02 NOTE — PROGRESS NOTE ADULT - SUBJECTIVE AND OBJECTIVE BOX
S:  MAGDI FIGUEROA is a 39y Female [facility] vaginal delivery Day 2          She is comfortable and offers no compliants.        Breast feeding.    PAST MEDICAL & SURGICAL HISTORY:  Vaginal delivery: 7/26/17 6#12  No significant past surgical history      O:  T(C): 36.8 (11-02-19 @ 05:27), Max: 36.8 (11-02-19 @ 05:27)  HR: 88 (11-02-19 @ 05:27) (88 - 93)  BP: 115/63 (11-02-19 @ 05:27) (95/57 - 115/63)  RR: 18 (11-02-19 @ 05:27) (17 - 18)  SpO2: 98% (11-02-19 @ 05:27) (98% - 98%)  Wt(kg): --                                 11.7   x     )-----------( x        ( 01 Nov 2019 06:30 )             34.6               Blood type: Type + Screen (10.31.19 @ 10:58)    ABO Interpretation: A    Rh Interpretation: Negative    Antibody Screen: Negative                                           Rubella:                 General: alert and oriented           Breast:  soft, nontender,            Abdomin:  soft nontender, BS+, Flatus(+), BM(+)           Fundus:  firm, nontender, below the umbilicus           Extremities:  no edema, no cyanosis, normal reflexes           Lochia:  mild    A:  Stable, Postpartum    P:   MAGDI FIGUEROA will be discharged home today. She is given instructions:                            1. Nothing per vagina-no tampons, douches, baths, swimming or sex for 6-8 weeks.             2.  To take ibuprofen 600 mg every 6 hours for pain or cramps. (Can take two Tylenol 325mg tablets every 6 hours as an                    alternative pain medication-NO MORE THAN 4000mg of Tylenol over 24hours)                       3.  Call the office and make postpartum visit for 3 weeks; sooner if bleeding becomes heavier, or she has feelings of                  depression or anxiety or develops a fever greater than 100.4 F.                 To use abdominal binder while awake as needed.                 Verbal discharge instructions d/w patient  - discharge summary to be  given to her on discharge for the hospital             4.  No driving x 2 weeks.              5.  Continue taking prenatal vitamins

## 2019-11-10 ENCOUNTER — TRANSCRIPTION ENCOUNTER (OUTPATIENT)
Age: 39
End: 2019-11-10

## 2020-08-25 ENCOUNTER — TRANSCRIPTION ENCOUNTER (OUTPATIENT)
Age: 40
End: 2020-08-25

## 2021-09-23 ENCOUNTER — APPOINTMENT (OUTPATIENT)
Dept: OBGYN | Facility: CLINIC | Age: 41
End: 2021-09-23
Payer: COMMERCIAL

## 2021-09-23 VITALS
BODY MASS INDEX: 28.53 KG/M2 | TEMPERATURE: 97.5 F | HEART RATE: 76 BPM | DIASTOLIC BLOOD PRESSURE: 68 MMHG | SYSTOLIC BLOOD PRESSURE: 107 MMHG | HEIGHT: 63 IN | WEIGHT: 161 LBS

## 2021-09-23 DIAGNOSIS — Z80.3 FAMILY HISTORY OF MALIGNANT NEOPLASM OF BREAST: ICD-10-CM

## 2021-09-23 DIAGNOSIS — Z82.49 FAMILY HISTORY OF ISCHEMIC HEART DISEASE AND OTHER DISEASES OF THE CIRCULATORY SYSTEM: ICD-10-CM

## 2021-09-23 DIAGNOSIS — Z86.19 PERSONAL HISTORY OF OTHER INFECTIOUS AND PARASITIC DISEASES: ICD-10-CM

## 2021-09-23 DIAGNOSIS — Z80.0 FAMILY HISTORY OF MALIGNANT NEOPLASM OF DIGESTIVE ORGANS: ICD-10-CM

## 2021-09-23 DIAGNOSIS — Z12.39 ENCOUNTER FOR OTHER SCREENING FOR MALIGNANT NEOPLASM OF BREAST: ICD-10-CM

## 2021-09-23 DIAGNOSIS — R92.2 INCONCLUSIVE MAMMOGRAM: ICD-10-CM

## 2021-09-23 DIAGNOSIS — N39.0 URINARY TRACT INFECTION, SITE NOT SPECIFIED: ICD-10-CM

## 2021-09-23 DIAGNOSIS — Z83.3 FAMILY HISTORY OF DIABETES MELLITUS: ICD-10-CM

## 2021-09-23 DIAGNOSIS — Z80.1 FAMILY HISTORY OF MALIGNANT NEOPLASM OF TRACHEA, BRONCHUS AND LUNG: ICD-10-CM

## 2021-09-23 DIAGNOSIS — Z87.39 PERSONAL HISTORY OF OTHER DISEASES OF THE MUSCULOSKELETAL SYSTEM AND CONNECTIVE TISSUE: ICD-10-CM

## 2021-09-23 PROCEDURE — 99386 PREV VISIT NEW AGE 40-64: CPT

## 2021-09-23 RX ORDER — FOLIC ACID/MULTIVIT,IRON,MINER 0.4MG-18MG
TABLET ORAL
Refills: 0 | Status: ACTIVE | COMMUNITY

## 2021-09-23 RX ORDER — IODINE/SODIUM IODIDE 2 %
1200 TINCTURE TOPICAL
Refills: 0 | Status: ACTIVE | COMMUNITY

## 2021-09-23 RX ORDER — BACILLUS COAGULANS/INULIN 1B-250 MG
CAPSULE ORAL
Refills: 0 | Status: ACTIVE | COMMUNITY

## 2021-09-23 RX ORDER — IBUPROFEN 200 MG/1
TABLET, COATED ORAL
Refills: 0 | Status: ACTIVE | COMMUNITY

## 2021-09-23 RX ORDER — ACETAMINOPHEN 325 MG/1
TABLET, FILM COATED ORAL
Refills: 0 | Status: ACTIVE | COMMUNITY

## 2021-09-24 LAB — HPV HIGH+LOW RISK DNA PNL CVX: NOT DETECTED

## 2021-09-27 ENCOUNTER — TRANSCRIPTION ENCOUNTER (OUTPATIENT)
Age: 41
End: 2021-09-27

## 2021-09-27 LAB — BACTERIA UR CULT: NORMAL

## 2021-09-29 LAB — CYTOLOGY CVX/VAG DOC THIN PREP: NORMAL

## 2021-10-19 ENCOUNTER — OUTPATIENT (OUTPATIENT)
Dept: OUTPATIENT SERVICES | Facility: HOSPITAL | Age: 41
LOS: 1 days | End: 2021-10-19
Payer: COMMERCIAL

## 2021-10-19 ENCOUNTER — RESULT REVIEW (OUTPATIENT)
Age: 41
End: 2021-10-19

## 2021-10-19 ENCOUNTER — NON-APPOINTMENT (OUTPATIENT)
Age: 41
End: 2021-10-19

## 2021-10-19 ENCOUNTER — APPOINTMENT (OUTPATIENT)
Dept: ULTRASOUND IMAGING | Facility: HOSPITAL | Age: 41
End: 2021-10-19
Payer: COMMERCIAL

## 2021-10-19 DIAGNOSIS — R92.2 INCONCLUSIVE MAMMOGRAM: ICD-10-CM

## 2021-10-19 DIAGNOSIS — Z12.39 ENCOUNTER FOR OTHER SCREENING FOR MALIGNANT NEOPLASM OF BREAST: ICD-10-CM

## 2021-10-19 PROCEDURE — 76641 ULTRASOUND BREAST COMPLETE: CPT | Mod: 26,50

## 2021-10-19 PROCEDURE — 76641 ULTRASOUND BREAST COMPLETE: CPT

## 2022-06-30 DIAGNOSIS — N93.9 ABNORMAL UTERINE AND VAGINAL BLEEDING, UNSPECIFIED: ICD-10-CM

## 2022-07-06 ENCOUNTER — ASOB RESULT (OUTPATIENT)
Age: 42
End: 2022-07-06

## 2022-07-06 ENCOUNTER — APPOINTMENT (OUTPATIENT)
Dept: OBGYN | Facility: CLINIC | Age: 42
End: 2022-07-06

## 2022-07-06 VITALS
SYSTOLIC BLOOD PRESSURE: 116 MMHG | HEIGHT: 63 IN | WEIGHT: 170 LBS | BODY MASS INDEX: 30.12 KG/M2 | HEART RATE: 88 BPM | DIASTOLIC BLOOD PRESSURE: 73 MMHG

## 2022-07-06 PROCEDURE — 76830 TRANSVAGINAL US NON-OB: CPT

## 2022-07-06 PROCEDURE — 99213 OFFICE O/P EST LOW 20 MIN: CPT

## 2022-09-22 NOTE — OB RN PATIENT PROFILE - POST PARTUM DEPRESSION SCREEN OB 3
[Normal Growth] : growth [Normal Development] : development [None] : No known medical problems [No Elimination Concerns] : elimination [No Feeding Concerns] : feeding [No Skin Concerns] : skin [Normal Sleep Pattern] : sleep [No Medications] : ~He/She~ is not on any medications [Parent/Guardian] : parent/guardian [] : The components of the vaccine(s) to be administered today are listed in the plan of care. The disease(s) for which the vaccine(s) are intended to prevent and the risks have been discussed with the caretaker.  The risks are also included in the appropriate vaccination information statements which have been provided to the patient's caregiver.  The caregiver has given consent to vaccinate. [FreeTextEntry1] : \par Patient to return for a well  appointment at 3 yo\par Dtap OOS- will contact pt to return when available  no

## 2023-01-17 ENCOUNTER — APPOINTMENT (OUTPATIENT)
Dept: ULTRASOUND IMAGING | Facility: HOSPITAL | Age: 43
End: 2023-01-17
Payer: COMMERCIAL

## 2023-01-17 ENCOUNTER — RESULT REVIEW (OUTPATIENT)
Age: 43
End: 2023-01-17

## 2023-01-17 ENCOUNTER — OUTPATIENT (OUTPATIENT)
Dept: OUTPATIENT SERVICES | Facility: HOSPITAL | Age: 43
LOS: 1 days | End: 2023-01-17
Payer: COMMERCIAL

## 2023-01-17 DIAGNOSIS — R92.2 INCONCLUSIVE MAMMOGRAM: ICD-10-CM

## 2023-01-17 PROCEDURE — 76641 ULTRASOUND BREAST COMPLETE: CPT

## 2023-01-17 PROCEDURE — 76641 ULTRASOUND BREAST COMPLETE: CPT | Mod: 26,50

## 2023-01-17 PROCEDURE — 76700 US EXAM ABDOM COMPLETE: CPT

## 2023-01-17 PROCEDURE — 76700 US EXAM ABDOM COMPLETE: CPT | Mod: 26

## 2023-02-06 ENCOUNTER — LABORATORY RESULT (OUTPATIENT)
Age: 43
End: 2023-02-06

## 2023-02-06 ENCOUNTER — APPOINTMENT (OUTPATIENT)
Dept: OBGYN | Facility: CLINIC | Age: 43
End: 2023-02-06
Payer: COMMERCIAL

## 2023-02-06 VITALS
SYSTOLIC BLOOD PRESSURE: 111 MMHG | WEIGHT: 184.9 LBS | HEIGHT: 63 IN | HEART RATE: 82 BPM | DIASTOLIC BLOOD PRESSURE: 73 MMHG | BODY MASS INDEX: 32.76 KG/M2

## 2023-02-06 PROCEDURE — 99396 PREV VISIT EST AGE 40-64: CPT

## 2023-02-07 RX ORDER — OMEPRAZOLE 40 MG/1
40 CAPSULE, DELAYED RELEASE ORAL
Qty: 30 | Refills: 0 | Status: ACTIVE | COMMUNITY
Start: 2023-01-17

## 2023-02-07 RX ORDER — ESCITALOPRAM OXALATE 10 MG/1
10 TABLET ORAL
Qty: 30 | Refills: 0 | Status: ACTIVE | COMMUNITY
Start: 2022-12-22

## 2023-02-07 NOTE — PHYSICAL EXAM
[Chaperone Present] : A chaperone was present in the examining room during all aspects of the physical examination [FreeTextEntry1] : JOSE MANUEL Martin [Appropriately responsive] : appropriately responsive [Alert] : alert [No Acute Distress] : no acute distress [No Lymphadenopathy] : no lymphadenopathy [Regular Rate Rhythm] : regular rate rhythm [No Murmurs] : no murmurs [Clear to Auscultation B/L] : clear to auscultation bilaterally [Soft] : soft [Non-tender] : non-tender [Non-distended] : non-distended [No HSM] : No HSM [No Lesions] : no lesions [No Mass] : no mass [Oriented x3] : oriented x3 [Examination Of The Breasts] : a normal appearance [No Discharge] : no discharge [No Masses] : no breast masses were palpable [Labia Majora] : normal [Labia Minora] : normal [Normal] : normal [Uterine Adnexae] : normal

## 2023-02-07 NOTE — PLAN
[FreeTextEntry1] : 41 y/o presents for annual visit. No GYN complaints.\par \par #HM\par -Pap with HPV today\par -Mammo up to date\par \par geno SALMERON\par

## 2023-04-26 NOTE — OB RN PATIENT PROFILE - NS PRO ABUSE SCREEN AFRAID ANYONE YN
Earl Abdul, a 64 y.o. female presents to the ED w/ complaint of N/V since this morning, pt states she wants to detox off alcohol     Triage note:  Chief Complaint   Patient presents with    Nausea     Nausea and vomiting since this morning. Pt states that she is an alcoholic. Drank half a pint today.     Review of patient's allergies indicates:   Allergen Reactions    Lortab [hydrocodone-acetaminophen] Itching    Promethazine Itching and Other (See Comments)     Past Medical History:   Diagnosis Date    Anemia     Anemia     Controlled type 2 diabetes mellitus without complication, without long-term current use of insulin 11/30/2021    COPD (chronic obstructive pulmonary disease)     Depression     Diverticulitis     Fatty liver     GERD (gastroesophageal reflux disease)     Hyperlipidemia     Hypertension     Pancreatitis     Peptic ulcer disease     Polysubstance abuse     Posterior reversible encephalopathy syndrome     Sarcoidosis of lung     Sarcoidosis of lung     over 30 yrs ago    Seizures     7/2017    Suicide attempt     Suicide ideation       
unable to assess

## 2023-04-26 NOTE — DISCHARGE NOTE OB - IF YOU ARE BREASTFEEDING, WARM SOAKS, BREAST MASSAGE AND FREQUENT FEEDINGS TO INITIATE LET-DOWN AND/OR ALLEVIATE DISCOMFORT FROM FIRM (ENGORGED) BREASTS
Location of patient: [address] call from [place] [facility] with [employer]. Subjective: Caller states \"I had a TBI last July. I have been through PT and cognitive therapy. I know part of the healing is fatigue. There are some days I feel good and some days I do a little bit and I am done. \"     Current Symptoms: fatigue    Onset: 3 weeks ago    Associated Symptoms:  denies other symptoms    Pain Severity: denies affiliated pain    Temperature: denies fever     What has been tried: therapy    LMP: NA Pregnant: NA    Recommended disposition: See PCP within 3 Days    Care advice provided, patient verbalizes understanding; denies any other questions or concerns; instructed to call back for any new or worsening symptoms. Patient/Caller agrees with recommended disposition; writer provided warm transfer to [place] [facility] for appointment scheduling    Attention Provider: Thank you for allowing me to participate in the care of your patient. The patient was connected to triage in response to information provided to the ECC/PSC. Please do not respond through this encounter as the response is not directed to a shared pool.       Reason for Disposition   MILD weakness (i.e., does not interfere with ability to work, go to school, normal activities) and persists > 1 week    Protocols used: Weakness (Generalized) and Fatigue-ADULT-OH Statement Selected

## 2023-08-01 ENCOUNTER — APPOINTMENT (OUTPATIENT)
Dept: OBGYN | Facility: CLINIC | Age: 43
End: 2023-08-01
Payer: COMMERCIAL

## 2023-08-01 VITALS
SYSTOLIC BLOOD PRESSURE: 113 MMHG | HEIGHT: 63 IN | DIASTOLIC BLOOD PRESSURE: 74 MMHG | BODY MASS INDEX: 32.25 KG/M2 | WEIGHT: 182 LBS | HEART RATE: 84 BPM

## 2023-08-01 DIAGNOSIS — R10.2 PELVIC AND PERINEAL PAIN: ICD-10-CM

## 2023-08-01 PROCEDURE — 99213 OFFICE O/P EST LOW 20 MIN: CPT

## 2023-08-02 NOTE — PLAN
[FreeTextEntry1] : 43y/o presents with pelvic pain and 2 periods during the month of July. Also reports hx of splinting to have BM since delivery of her last child  #Pelvic pain -Requisition for pelvic sono provided -Suspect potential ruptured cyst  #Splinting -Significant rectocele noted on exam -Offered referral to urogyn, pt accepted. Contact information for [address] [place] [address] Office provided.   #Vaginal tissue protrusion -Small 0.5 fleshy tissue noted anteriorly under urethra -Likely from laceration during vaginal delivery -Pt would like this removed, RTO for removal   geno SALMERON

## 2023-08-02 NOTE — PHYSICAL EXAM
[Chaperone Present] : A chaperone was present in the examining room during all aspects of the physical examination [FreeTextEntry1] : JOSE MANUEL Menjivar [Appropriately responsive] : appropriately responsive [Alert] : alert [No Acute Distress] : no acute distress [No Lymphadenopathy] : no lymphadenopathy [Regular Rate Rhythm] : regular rate rhythm [No Murmurs] : no murmurs [Clear to Auscultation B/L] : clear to auscultation bilaterally [Soft] : soft [Non-tender] : non-tender [Non-distended] : non-distended [No HSM] : No HSM [No Lesions] : no lesions [No Mass] : no mass [Oriented x3] : oriented x3 [Labia Majora] : normal [Labia Minora] : normal [Rectocele] : a rectocele [Normal] : normal [Uterine Adnexae] : normal [FreeTextEntry4] : Small 0.5cm tissue protrusion noted anteriorly below urethra [FreeTextEntry8] : Non tender

## 2023-08-02 NOTE — HISTORY OF PRESENT ILLNESS
[FreeTextEntry1] : 41y/o presents with abdominal cramping Pt reports LMP 7/2, then again 7/22, usually gets 1 monthly period. Pt reports significant pelvic cramping since 7/22. Has not taken pain medication Denies hx of ovarian cysts.  Pt also notes that since the delivery of her 3rd child she has been splinting in order to have a bowel movement. Reports she does this with each bowel movement.  Pt also notes a piece of tissue that she notices hanging from the vaginal since the delivery of her 3rd child.

## 2023-08-04 ENCOUNTER — APPOINTMENT (OUTPATIENT)
Dept: OBGYN | Facility: CLINIC | Age: 43
End: 2023-08-04
Payer: COMMERCIAL

## 2023-08-04 ENCOUNTER — ASOB RESULT (OUTPATIENT)
Age: 43
End: 2023-08-04

## 2023-08-04 PROCEDURE — 76830 TRANSVAGINAL US NON-OB: CPT

## 2024-01-09 ENCOUNTER — APPOINTMENT (OUTPATIENT)
Dept: OBGYN | Facility: CLINIC | Age: 44
End: 2024-01-09

## 2024-02-09 ENCOUNTER — APPOINTMENT (OUTPATIENT)
Dept: OBGYN | Facility: CLINIC | Age: 44
End: 2024-02-09
Payer: COMMERCIAL

## 2024-02-09 DIAGNOSIS — Z01.419 ENCOUNTER FOR GYNECOLOGICAL EXAMINATION (GENERAL) (ROUTINE) W/OUT ABNORMAL FINDINGS: ICD-10-CM

## 2024-02-09 PROCEDURE — 99396 PREV VISIT EST AGE 40-64: CPT

## 2024-02-09 NOTE — PHYSICAL EXAM
[Chaperone Present] : A chaperone was present in the examining room during all aspects of the physical examination [FreeTextEntry1] : JOSE MANUEL Martin [Appropriately responsive] : appropriately responsive [Alert] : alert [No Acute Distress] : no acute distress [No Lymphadenopathy] : no lymphadenopathy [Regular Rate Rhythm] : regular rate rhythm [No Murmurs] : no murmurs [Clear to Auscultation B/L] : clear to auscultation bilaterally [Soft] : soft [Non-tender] : non-tender [Non-distended] : non-distended [No HSM] : No HSM [No Lesions] : no lesions [No Mass] : no mass [Oriented x3] : oriented x3 [Examination Of The Breasts] : a normal appearance [No Masses] : no breast masses were palpable [Labia Majora] : normal [Labia Minora] : normal [Rectocele] : a rectocele [Normal] : normal [Tenderness] : nontender [Enlarged ___ wks] : not enlarged [Uterine Adnexae] : normal

## 2024-02-09 NOTE — HISTORY OF PRESENT ILLNESS
[FreeTextEntry1] : 44 y/o presents for annual visit.  Pt continues to splint with most bowel movements, has not yet seen urogyn  LMP Jan, cycles average 30 days, last 7 days  Not sexually active currently   FH: colon cancer in both parents, age 60-70  HM  Pap NILM HPV neg 2023, hx of colpo in 2007  Mammo Jan 2023 BIRADS 1 Has script for colonoscopy

## 2024-02-09 NOTE — PLAN
[FreeTextEntry1] : 44 y/o presents for annual visit.   #HM -Pap with HPV today at pt's request. Pt educated on [facility] pap screening guidelines n -Mammo script from PCP -Pt advised to get colonoscopy by PCP due to FH  #Splinting -Encouraged f/u with urogyn, referral provided again today   RTO 1 year or PRN geno SALMERON

## 2024-02-14 LAB — HPV HIGH+LOW RISK DNA PNL CVX: NOT DETECTED

## 2024-02-16 LAB — CYTOLOGY CVX/VAG DOC THIN PREP: NORMAL

## 2024-03-28 ENCOUNTER — NON-APPOINTMENT (OUTPATIENT)
Age: 44
End: 2024-03-28

## 2024-04-06 ENCOUNTER — NON-APPOINTMENT (OUTPATIENT)
Age: 44
End: 2024-04-06

## 2024-06-04 ENCOUNTER — APPOINTMENT (OUTPATIENT)
Dept: UROGYNECOLOGY | Facility: CLINIC | Age: 44
End: 2024-06-04
Payer: COMMERCIAL

## 2024-06-04 VITALS
WEIGHT: 169 LBS | HEIGHT: 62.5 IN | BODY MASS INDEX: 30.32 KG/M2 | SYSTOLIC BLOOD PRESSURE: 120 MMHG | DIASTOLIC BLOOD PRESSURE: 74 MMHG | HEART RATE: 82 BPM

## 2024-06-04 DIAGNOSIS — N81.6 RECTOCELE: ICD-10-CM

## 2024-06-04 DIAGNOSIS — N36.41 HYPERMOBILITY OF URETHRA: ICD-10-CM

## 2024-06-04 DIAGNOSIS — N39.3 STRESS INCONTINENCE (FEMALE) (MALE): ICD-10-CM

## 2024-06-04 LAB
BILIRUB UR QL STRIP: NORMAL
CLARITY UR: CLEAR
COLLECTION METHOD: NORMAL
GLUCOSE UR-MCNC: NORMAL
HCG UR QL: 0.2 EU/DL
HGB UR QL STRIP.AUTO: NORMAL
KETONES UR-MCNC: NORMAL
LEUKOCYTE ESTERASE UR QL STRIP: NORMAL
NITRITE UR QL STRIP: NORMAL
PH UR STRIP: 6.5
PROT UR STRIP-MCNC: NORMAL
SP GR UR STRIP: 1.01

## 2024-06-04 PROCEDURE — 99459 PELVIC EXAMINATION: CPT

## 2024-06-04 PROCEDURE — 99204 OFFICE O/P NEW MOD 45 MIN: CPT | Mod: 25

## 2024-06-04 PROCEDURE — 51701 INSERT BLADDER CATHETER: CPT

## 2024-06-04 NOTE — DISCUSSION/SUMMARY
[FreeTextEntry1] :  Rectocele: We reviewed management options for her prolapse including: observation, pelvic floor exercises with and without PT, pessary, and surgical management. We reviewed posterior repair. She would like to try pelvic floor PT, Rx provided with contact information for local providers. Written instructions on how to perform the exercises were also provided to her. She would also like to try a pessary. [facility] info on pessaries provided.  SURINDER:  We reviewed management options for stress urinary incontinence including: observation, pelvic floor exercises, continence devices, periurethral bulking agents,  and surgical management. We discussed surgical management options and written information on stress urinary incontinence including management options from [facility] was provided to her and reviewed. She would like to try PFPT and a pessary.   She will RTO for pessary fitting, start PFPT and follow up with me in 6 months to review progress

## 2024-06-04 NOTE — HISTORY OF PRESENT ILLNESS
[Rectal Prolapse] : moderate [Unable To Restrain Bowel Movement] : no [Feelings Of Urinary Urgency] : moderate [x2] : nocturia two times a night [Urinary Tract Infection] : no [Uses ___ pads per day] : uses [unfilled] pad(s) per day [Constipation Obstructed Defecation] : no [Stool Visible Blood] : no [Incomplete Emptying Of Stool] : no [] : years ago [Rectal Pain] : no [FreeTextEntry1] :   PSH: denies  Family History: colon cancer (mother and father) Social History: employed as malpractice defense   daily fluid intake: 8 c water, 12 oz diet coke, 36 oz seltzer, 2 c coffee

## 2024-06-04 NOTE — PHYSICAL EXAM
[Chaperone Present] : A chaperone was present in the examining room during all aspects of the physical examination [[id number]] : A chaperone was present during the pelvic exam. [Labia Majora] : were normal [Labia Minora] : were normal [Normal Appearance] : general appearance was normal [Normal] : no abnormalities [FreeTextEntry1] : General: Well, appearing. Alert and orientated. No acute distress HEENT: Normocephalic, atraumatic and extraocular muscles appear to be intact  Neck: Full range of motion, no obvious lymphadenopathy, deformities, or masses noted  Respiratory: Speaking in full sentences comfortably, normal work of breathing and no cough during visit Musculoskeletal: active full range of motion in extremities  Extremities: No upper extremity edema noted Skin: no obvious rash or skin lesions Neuro: Orientated X 3, speech is fluent, normal rate Psych: Normal mood and affect  [Tenderness] : ~T no ~M abdominal tenderness observed [Distended] : not distended [2] : 2 [Aa ____] : Aa [unfilled] [Ba ____] : Ba [unfilled] [C ____] : C [unfilled] [GH ____] : GH [unfilled] [PB ____] : PB [unfilled] [TVL ____] : TVL  [unfilled] [Ap ____] : Ap [unfilled] [Bp ____] : Bp [unfilled] [D ____] : D [unfilled] [Normal rectal exam] : was normal

## 2024-06-05 DIAGNOSIS — Z86.79 PERSONAL HISTORY OF OTHER DISEASES OF THE CIRCULATORY SYSTEM: ICD-10-CM

## 2024-06-05 DIAGNOSIS — Z82.3 FAMILY HISTORY OF STROKE: ICD-10-CM

## 2024-06-05 DIAGNOSIS — Z86.59 PERSONAL HISTORY OF OTHER MENTAL AND BEHAVIORAL DISORDERS: ICD-10-CM

## 2024-06-05 DIAGNOSIS — Z87.19 PERSONAL HISTORY OF OTHER DISEASES OF THE DIGESTIVE SYSTEM: ICD-10-CM

## 2024-06-05 DIAGNOSIS — Z86.39 PERSONAL HISTORY OF OTHER ENDOCRINE, NUTRITIONAL AND METABOLIC DISEASE: ICD-10-CM

## 2024-06-24 ENCOUNTER — APPOINTMENT (OUTPATIENT)
Dept: UROGYNECOLOGY | Facility: CLINIC | Age: 44
End: 2024-06-24

## 2024-12-13 NOTE — DISCHARGE NOTE OB - YES
Goal Outcome Evaluation:  Plan of Care Reviewed With: patient, spouse           Outcome Evaluation: PT eval completed. Pt in fowlers position, AOx4 and agreeable to session. Pt reports he has been Indep and hopes to return to indep soon pending stability s/p shunt placement. Pt brought self to EOB with SBA and stood upon coming to EOB. No formal AROM or strength testing performed but remained WFL globally. Pt t/f to chair and upon turning to sit had LOB and required Min A to correct. Due to pt LOB, pt also became more fearful and demo more slow gait and movements than he feels are his normal but  tolerated walking 100' in [place] with CGA and FWW and 20' in room at attempted TUG. TUG results inconclusive, due to pt reporting extreme fear of falling after LOB at completion of session, and returned to fowlers position. Pt will benefit from skilled PT services to improve activity tolerance, decrease fall risk and continued safety awareness. Recommend home with assist and possible OPPT pending progress towards goals.    Anticipated Discharge Disposition (PT): home with assist, home with outpatient therapy services                         Statement Selected

## 2024-12-18 ENCOUNTER — APPOINTMENT (OUTPATIENT)
Dept: UROGYNECOLOGY | Facility: CLINIC | Age: 44
End: 2024-12-18
